# Patient Record
Sex: FEMALE | Race: WHITE | NOT HISPANIC OR LATINO | Employment: FULL TIME | URBAN - METROPOLITAN AREA
[De-identification: names, ages, dates, MRNs, and addresses within clinical notes are randomized per-mention and may not be internally consistent; named-entity substitution may affect disease eponyms.]

---

## 2020-04-08 ENCOUNTER — TELEPHONE (OUTPATIENT)
Dept: OBSTETRICS AND GYNECOLOGY | Facility: CLINIC | Age: 50
End: 2020-04-08

## 2020-04-08 DIAGNOSIS — N95.1 MENOPAUSAL SYMPTOMS: Primary | ICD-10-CM

## 2020-04-08 RX ORDER — NORETHINDRONE 5 MG/1
TABLET ORAL
Qty: 45 TABLET | Refills: 1 | Status: SHIPPED | OUTPATIENT
Start: 2020-04-08 | End: 2020-09-28

## 2020-04-08 RX ORDER — ESTRADIOL 1 MG/1
1 TABLET ORAL DAILY
Qty: 90 TABLET | Refills: 1 | Status: SHIPPED | OUTPATIENT
Start: 2020-04-08 | End: 2020-09-28

## 2020-04-08 NOTE — TELEPHONE ENCOUNTER
----- Message from Morris Reyes sent at 4/8/2020 12:10 PM CDT -----  Contact: Pt  Patsy called to speak with someone again about a prescription question she has [phone] (home).

## 2020-04-08 NOTE — TELEPHONE ENCOUNTER
----- Message from Luisana Dos Santos sent at 4/8/2020 10:49 AM CDT -----  Contact: patient  Type:  Patient Returning Call    Who Called:patient  Who Left Message for Patient:nurseRenea  Does the patient know what this is regarding?:yes  Would the patient rather a call back or a response via [employer]? Call back  Best Call Back Number:[phone]  Additional Information: n/a

## 2020-04-08 NOTE — TELEPHONE ENCOUNTER
----- Message from Morris Reyes sent at 4/8/2020  9:55 AM CDT -----  Contact: Pt  Patsy needs to speak with someone regarding a hormone medicine she's taking [phone].

## 2020-04-08 NOTE — TELEPHONE ENCOUNTER
Dr. Brennan spoke with Patient about talking with insurance to see what hormones was covered. Patient will send fax to Dr. Brennan showing what hormones were covered under her insurance

## 2020-04-08 NOTE — TELEPHONE ENCOUNTER
Patient called her Insurance company they did not have a formulary lists of medicines. They do approve of Estradiol 1mg and Norethindrone .35mg. If you approve of this please Send to [facility] for 90 days

## 2020-04-13 ENCOUNTER — TELEPHONE (OUTPATIENT)
Dept: OBSTETRICS AND GYNECOLOGY | Facility: CLINIC | Age: 50
End: 2020-04-13

## 2020-04-13 NOTE — TELEPHONE ENCOUNTER
Pt notified to take 1/2 tablet of 5 mg Norethindrone daily to equal 2.5 mg. Pt states understanding

## 2020-04-13 NOTE — TELEPHONE ENCOUNTER
----- Message from Gege Foss sent at 4/13/2020 11:18 AM CDT -----  Contact: self  Requesting call back regarding pt prescription. Please call back at [phone].

## 2020-04-13 NOTE — TELEPHONE ENCOUNTER
Patient just wants to make she is getting the right medicine strength. The pharmacy is sending 5Mg of Norethindrone she thought that she was getting the .35mg strength?     Please Advise

## 2020-05-26 ENCOUNTER — TELEPHONE (OUTPATIENT)
Dept: OBSTETRICS AND GYNECOLOGY | Facility: CLINIC | Age: 50
End: 2020-05-26

## 2020-05-26 NOTE — TELEPHONE ENCOUNTER
Spoke w/pt and she'd like to have her testosterone levels checked. Pt states she's having hot flashes and horrible headaches the day the shot is due and after the shot both those headaches are manageable. Pt uses [facility] on [address].

## 2020-05-26 NOTE — TELEPHONE ENCOUNTER
----- Message from Ivania Lozano sent at 5/26/2020  3:12 PM CDT -----  Contact: Patient   .Type:  Needs Medical Advice    Who Called:  Patient   Symptoms (please be specific):  Hormone levels    How long has patient had these symptoms:    Pharmacy name and phone #:    Would the patient rather a call back or a response via [facility]? call  Best Call Back Number:  [phone]  Additional Information: n/a

## 2020-05-27 LAB
E2: 42 PG/ML
FREE TESTOSTERONE: 9.26 NG/DL (ref 0–1)

## 2020-05-28 ENCOUNTER — PATIENT MESSAGE (OUTPATIENT)
Dept: OBSTETRICS AND GYNECOLOGY | Facility: CLINIC | Age: 50
End: 2020-05-28

## 2020-05-28 NOTE — TELEPHONE ENCOUNTER
Please notify pt that her testosterone level was elevated. This would be expected if she had her testosterone shot recently. If she needs to have the shot a day or 2 days early each month that should be fine.

## 2020-06-25 ENCOUNTER — PATIENT MESSAGE (OUTPATIENT)
Dept: OBSTETRICS AND GYNECOLOGY | Facility: CLINIC | Age: 50
End: 2020-06-25

## 2020-06-25 NOTE — TELEPHONE ENCOUNTER
Please notify pt that fluoxetine refilled. Please schedule pt's appt for annual that was originally July 3.

## 2020-07-13 DIAGNOSIS — N95.1 MENOPAUSAL SYMPTOMS: Primary | ICD-10-CM

## 2020-07-13 RX ORDER — TESTOSTERONE CYPIONATE 200 MG/ML
INJECTION, SOLUTION INTRAMUSCULAR
Qty: 1 ML | Refills: 2 | Status: SHIPPED | OUTPATIENT
Start: 2020-07-13 | End: 2021-02-24

## 2020-08-24 DIAGNOSIS — F32.A DEPRESSION, UNSPECIFIED DEPRESSION TYPE: ICD-10-CM

## 2020-08-24 RX ORDER — FLUOXETINE 10 MG/1
CAPSULE ORAL
Qty: 30 CAPSULE | Refills: 3 | Status: SHIPPED | OUTPATIENT
Start: 2020-08-24 | End: 2020-11-16

## 2020-08-24 NOTE — TELEPHONE ENCOUNTER
Notified pt her medication has been sent to pharmacy. Annual appt. christiano'd for 1/2021.  Pt verbalized understanding.

## 2020-12-31 RX ORDER — SUCRALFATE 1 G/1
1 TABLET ORAL 4 TIMES DAILY
COMMUNITY
Start: 2020-11-09 | End: 2021-03-04

## 2020-12-31 RX ORDER — OMEPRAZOLE 40 MG/1
40 CAPSULE, DELAYED RELEASE ORAL DAILY
COMMUNITY
Start: 2020-12-10 | End: 2023-01-10

## 2020-12-31 RX ORDER — FLUTICASONE PROPIONATE 50 MCG
1 SPRAY, SUSPENSION (ML) NASAL DAILY
COMMUNITY
Start: 2020-11-13 | End: 2021-02-24

## 2021-01-04 ENCOUNTER — OFFICE VISIT (OUTPATIENT)
Dept: OBSTETRICS AND GYNECOLOGY | Facility: CLINIC | Age: 51
End: 2021-01-04
Payer: COMMERCIAL

## 2021-01-04 VITALS
DIASTOLIC BLOOD PRESSURE: 60 MMHG | HEIGHT: 64 IN | BODY MASS INDEX: 25.64 KG/M2 | WEIGHT: 150.19 LBS | HEART RATE: 60 BPM | SYSTOLIC BLOOD PRESSURE: 110 MMHG

## 2021-01-04 DIAGNOSIS — N95.1 MENOPAUSAL SYMPTOMS: ICD-10-CM

## 2021-01-04 DIAGNOSIS — Z12.31 SCREENING MAMMOGRAM, ENCOUNTER FOR: ICD-10-CM

## 2021-01-04 DIAGNOSIS — Z01.419 WELL WOMAN EXAM WITH ROUTINE GYNECOLOGICAL EXAM: Primary | ICD-10-CM

## 2021-01-04 PROCEDURE — 1126F PR PAIN SEVERITY QUANTIFIED, NO PAIN PRESENT: ICD-10-PCS | Mod: S$GLB,,, | Performed by: OBSTETRICS & GYNECOLOGY

## 2021-01-04 PROCEDURE — 99396 PR PREVENTIVE VISIT,EST,40-64: ICD-10-PCS | Mod: S$GLB,,, | Performed by: OBSTETRICS & GYNECOLOGY

## 2021-01-04 PROCEDURE — 1126F AMNT PAIN NOTED NONE PRSNT: CPT | Mod: S$GLB,,, | Performed by: OBSTETRICS & GYNECOLOGY

## 2021-01-04 PROCEDURE — 3008F BODY MASS INDEX DOCD: CPT | Mod: CPTII,S$GLB,, | Performed by: OBSTETRICS & GYNECOLOGY

## 2021-01-04 PROCEDURE — 99396 PREV VISIT EST AGE 40-64: CPT | Mod: S$GLB,,, | Performed by: OBSTETRICS & GYNECOLOGY

## 2021-01-04 PROCEDURE — 3008F PR BODY MASS INDEX (BMI) DOCUMENTED: ICD-10-PCS | Mod: CPTII,S$GLB,, | Performed by: OBSTETRICS & GYNECOLOGY

## 2021-01-04 RX ORDER — FLUOXETINE 10 MG/1
10 CAPSULE ORAL DAILY
Qty: 90 CAPSULE | Refills: 4 | Status: SHIPPED | OUTPATIENT
Start: 2021-01-04 | End: 2021-02-19 | Stop reason: ALTCHOICE

## 2021-01-05 LAB
E2: 46 PG/ML
FREE TESTOSTERONE: 0.39 NG/DL (ref 0–1)

## 2021-01-06 ENCOUNTER — TELEPHONE (OUTPATIENT)
Dept: OBSTETRICS AND GYNECOLOGY | Facility: CLINIC | Age: 51
End: 2021-01-06

## 2021-01-06 DIAGNOSIS — N95.1 MENOPAUSAL SYMPTOMS: Primary | ICD-10-CM

## 2021-01-19 ENCOUNTER — TELEPHONE (OUTPATIENT)
Dept: OBSTETRICS AND GYNECOLOGY | Facility: CLINIC | Age: 51
End: 2021-01-19

## 2021-02-08 LAB
E2: 21.5 PG/ML
FREE TESTOSTERONE: NORMAL

## 2021-02-11 ENCOUNTER — TELEPHONE (OUTPATIENT)
Dept: OBSTETRICS AND GYNECOLOGY | Facility: CLINIC | Age: 51
End: 2021-02-11

## 2021-02-19 ENCOUNTER — TELEPHONE (OUTPATIENT)
Dept: OBSTETRICS AND GYNECOLOGY | Facility: CLINIC | Age: 51
End: 2021-02-19

## 2021-02-19 DIAGNOSIS — F41.9 ANXIETY: Primary | ICD-10-CM

## 2021-02-19 RX ORDER — ESCITALOPRAM OXALATE 10 MG/1
10 TABLET ORAL DAILY
Qty: 30 TABLET | Refills: 2 | Status: SHIPPED | OUTPATIENT
Start: 2021-02-19 | End: 2021-03-04

## 2021-02-24 ENCOUNTER — OFFICE VISIT (OUTPATIENT)
Dept: OBSTETRICS AND GYNECOLOGY | Facility: CLINIC | Age: 51
End: 2021-02-24
Payer: COMMERCIAL

## 2021-02-24 DIAGNOSIS — R68.82 DECREASED LIBIDO: ICD-10-CM

## 2021-02-24 DIAGNOSIS — N95.1 MENOPAUSE SYNDROME: ICD-10-CM

## 2021-02-24 DIAGNOSIS — R53.83 FATIGUE, UNSPECIFIED TYPE: Primary | ICD-10-CM

## 2021-02-24 DIAGNOSIS — R45.86 MOOD CHANGES: ICD-10-CM

## 2021-02-24 PROCEDURE — 99213 PR OFFICE/OUTPT VISIT, EST, LEVL III, 20-29 MIN: ICD-10-PCS | Mod: S$GLB,,, | Performed by: NURSE PRACTITIONER

## 2021-02-24 PROCEDURE — 99213 OFFICE O/P EST LOW 20 MIN: CPT | Mod: S$GLB,,, | Performed by: NURSE PRACTITIONER

## 2021-02-28 ENCOUNTER — PATIENT MESSAGE (OUTPATIENT)
Dept: OBSTETRICS AND GYNECOLOGY | Facility: CLINIC | Age: 51
End: 2021-02-28

## 2021-03-04 ENCOUNTER — PROCEDURE VISIT (OUTPATIENT)
Dept: OBSTETRICS AND GYNECOLOGY | Facility: CLINIC | Age: 51
End: 2021-03-04

## 2021-03-04 VITALS
HEIGHT: 64 IN | DIASTOLIC BLOOD PRESSURE: 79 MMHG | BODY MASS INDEX: 23.9 KG/M2 | WEIGHT: 140 LBS | SYSTOLIC BLOOD PRESSURE: 131 MMHG

## 2021-03-04 DIAGNOSIS — R53.83 FATIGUE, UNSPECIFIED TYPE: ICD-10-CM

## 2021-03-04 DIAGNOSIS — N95.1 MENOPAUSE SYNDROME: Primary | ICD-10-CM

## 2021-03-04 PROCEDURE — 11980 IMPLANT HORMONE PELLET(S): CPT | Mod: S$GLB,,, | Performed by: NURSE PRACTITIONER

## 2021-03-04 PROCEDURE — 11980 PR IMPLANT,HORMONE,SUBCUTANEOUS: ICD-10-PCS | Mod: S$GLB,,, | Performed by: NURSE PRACTITIONER

## 2021-03-15 ENCOUNTER — PATIENT MESSAGE (OUTPATIENT)
Dept: OBSTETRICS AND GYNECOLOGY | Facility: CLINIC | Age: 51
End: 2021-03-15

## 2021-04-14 LAB
E2: 61.3 PG/ML
FSH: <1 MIU/ML
TESTOST SERPL-MCNC: 134 NG/DL (ref 2.9–40.8)

## 2021-04-15 ENCOUNTER — PATIENT MESSAGE (OUTPATIENT)
Dept: OBSTETRICS AND GYNECOLOGY | Facility: CLINIC | Age: 51
End: 2021-04-15

## 2021-06-01 ENCOUNTER — PATIENT MESSAGE (OUTPATIENT)
Dept: OBSTETRICS AND GYNECOLOGY | Facility: CLINIC | Age: 51
End: 2021-06-01

## 2021-06-10 ENCOUNTER — PROCEDURE VISIT (OUTPATIENT)
Dept: OBSTETRICS AND GYNECOLOGY | Facility: CLINIC | Age: 51
End: 2021-06-10

## 2021-06-10 VITALS
SYSTOLIC BLOOD PRESSURE: 109 MMHG | DIASTOLIC BLOOD PRESSURE: 71 MMHG | HEART RATE: 73 BPM | WEIGHT: 140 LBS | HEIGHT: 64 IN | BODY MASS INDEX: 23.9 KG/M2

## 2021-06-10 DIAGNOSIS — R53.83 FATIGUE, UNSPECIFIED TYPE: ICD-10-CM

## 2021-06-10 DIAGNOSIS — N95.1 MENOPAUSE SYNDROME: Primary | ICD-10-CM

## 2021-06-10 PROCEDURE — 11980 PR IMPLANT,HORMONE,SUBCUTANEOUS: ICD-10-PCS | Mod: S$GLB,,, | Performed by: NURSE PRACTITIONER

## 2021-06-10 PROCEDURE — 11980 IMPLANT HORMONE PELLET(S): CPT | Mod: S$GLB,,, | Performed by: NURSE PRACTITIONER

## 2021-06-10 RX ORDER — PROGESTERONE 200 MG/1
200 CAPSULE ORAL NIGHTLY
Qty: 30 CAPSULE | Refills: 11 | Status: SHIPPED | OUTPATIENT
Start: 2021-06-10 | End: 2022-03-18 | Stop reason: ALTCHOICE

## 2021-08-25 ENCOUNTER — PATIENT MESSAGE (OUTPATIENT)
Dept: OBSTETRICS AND GYNECOLOGY | Facility: CLINIC | Age: 51
End: 2021-08-25

## 2021-09-13 ENCOUNTER — PROCEDURE VISIT (OUTPATIENT)
Dept: OBSTETRICS AND GYNECOLOGY | Facility: CLINIC | Age: 51
End: 2021-09-13

## 2021-09-13 VITALS
SYSTOLIC BLOOD PRESSURE: 115 MMHG | BODY MASS INDEX: 24.24 KG/M2 | WEIGHT: 142 LBS | DIASTOLIC BLOOD PRESSURE: 74 MMHG | HEIGHT: 64 IN | HEART RATE: 74 BPM

## 2021-09-13 DIAGNOSIS — N95.1 MENOPAUSE SYNDROME: Primary | ICD-10-CM

## 2021-09-13 DIAGNOSIS — R53.83 FATIGUE, UNSPECIFIED TYPE: ICD-10-CM

## 2021-09-13 PROCEDURE — 11980 PR IMPLANT,HORMONE,SUBCUTANEOUS: ICD-10-PCS | Mod: S$GLB,,, | Performed by: NURSE PRACTITIONER

## 2021-09-13 PROCEDURE — 11980 IMPLANT HORMONE PELLET(S): CPT | Mod: S$GLB,,, | Performed by: NURSE PRACTITIONER

## 2021-09-13 RX ORDER — FLUTICASONE PROPIONATE 50 MCG
SPRAY, SUSPENSION (ML) NASAL
COMMUNITY
Start: 2021-08-31

## 2021-10-25 LAB
CHOLEST SERPL-MSCNC: 198 MG/DL (ref 100–200)
HDLC SERPL-MCNC: 61 MG/DL
LDL/HDL RATIO: 2 (ref 1–3)
LDLC SERPL CALC-MCNC: 119.8 MG/DL (ref 0–100)
TESTOST SERPL-MCNC: 268 NG/DL (ref 2.9–40.8)
TRIGL SERPL-MCNC: 86 MG/DL (ref 0–150)

## 2021-10-26 ENCOUNTER — PATIENT MESSAGE (OUTPATIENT)
Dept: OBSTETRICS AND GYNECOLOGY | Facility: CLINIC | Age: 51
End: 2021-10-26
Payer: COMMERCIAL

## 2021-11-29 ENCOUNTER — TELEPHONE (OUTPATIENT)
Dept: OBSTETRICS AND GYNECOLOGY | Facility: CLINIC | Age: 51
End: 2021-11-29
Payer: COMMERCIAL

## 2021-11-29 ENCOUNTER — OFFICE VISIT (OUTPATIENT)
Dept: OBSTETRICS AND GYNECOLOGY | Facility: CLINIC | Age: 51
End: 2021-11-29
Payer: COMMERCIAL

## 2021-11-29 VITALS
SYSTOLIC BLOOD PRESSURE: 128 MMHG | DIASTOLIC BLOOD PRESSURE: 89 MMHG | BODY MASS INDEX: 22.42 KG/M2 | WEIGHT: 130.63 LBS

## 2021-11-29 DIAGNOSIS — F32.A ANXIETY AND DEPRESSION: Primary | ICD-10-CM

## 2021-11-29 DIAGNOSIS — F41.9 ANXIETY AND DEPRESSION: Primary | ICD-10-CM

## 2021-11-29 PROCEDURE — 99213 PR OFFICE/OUTPT VISIT, EST, LEVL III, 20-29 MIN: ICD-10-PCS | Mod: S$GLB,,, | Performed by: OBSTETRICS & GYNECOLOGY

## 2021-11-29 PROCEDURE — 99213 OFFICE O/P EST LOW 20 MIN: CPT | Mod: S$GLB,,, | Performed by: OBSTETRICS & GYNECOLOGY

## 2021-11-29 RX ORDER — ESCITALOPRAM OXALATE 10 MG/1
10 TABLET ORAL DAILY
Qty: 30 TABLET | Refills: 5 | Status: SHIPPED | OUTPATIENT
Start: 2021-11-29 | End: 2021-12-15 | Stop reason: DRUGHIGH

## 2021-12-06 ENCOUNTER — TELEPHONE (OUTPATIENT)
Dept: OBSTETRICS AND GYNECOLOGY | Facility: CLINIC | Age: 51
End: 2021-12-06
Payer: COMMERCIAL

## 2021-12-06 DIAGNOSIS — N95.1 MENOPAUSAL SYMPTOMS: Primary | ICD-10-CM

## 2021-12-07 ENCOUNTER — TELEPHONE (OUTPATIENT)
Dept: OBSTETRICS AND GYNECOLOGY | Facility: CLINIC | Age: 51
End: 2021-12-07
Payer: COMMERCIAL

## 2021-12-10 ENCOUNTER — PATIENT MESSAGE (OUTPATIENT)
Dept: OBSTETRICS AND GYNECOLOGY | Facility: CLINIC | Age: 51
End: 2021-12-10
Payer: COMMERCIAL

## 2021-12-10 DIAGNOSIS — F32.A ANXIETY AND DEPRESSION: Primary | ICD-10-CM

## 2021-12-10 DIAGNOSIS — F41.9 ANXIETY AND DEPRESSION: Primary | ICD-10-CM

## 2021-12-14 RX ORDER — CLORAZEPATE DIPOTASSIUM 7.5 MG/1
7.5 TABLET ORAL 2 TIMES DAILY PRN
Qty: 30 TABLET | Refills: 0 | Status: SHIPPED | OUTPATIENT
Start: 2021-12-14 | End: 2021-12-30 | Stop reason: SDUPTHER

## 2021-12-15 ENCOUNTER — TELEPHONE (OUTPATIENT)
Dept: OBSTETRICS AND GYNECOLOGY | Facility: CLINIC | Age: 51
End: 2021-12-15
Payer: COMMERCIAL

## 2021-12-15 DIAGNOSIS — F32.A ANXIETY AND DEPRESSION: Primary | ICD-10-CM

## 2021-12-15 DIAGNOSIS — F41.9 ANXIETY AND DEPRESSION: Primary | ICD-10-CM

## 2021-12-15 RX ORDER — ESCITALOPRAM OXALATE 20 MG/1
20 TABLET ORAL DAILY
Qty: 90 TABLET | Refills: 3 | Status: SHIPPED | OUTPATIENT
Start: 2021-12-15 | End: 2022-01-05 | Stop reason: SDUPTHER

## 2021-12-30 ENCOUNTER — PATIENT MESSAGE (OUTPATIENT)
Dept: OBSTETRICS AND GYNECOLOGY | Facility: CLINIC | Age: 51
End: 2021-12-30
Payer: COMMERCIAL

## 2022-01-05 ENCOUNTER — OFFICE VISIT (OUTPATIENT)
Dept: OBSTETRICS AND GYNECOLOGY | Facility: CLINIC | Age: 52
End: 2022-01-05
Payer: COMMERCIAL

## 2022-01-05 VITALS
SYSTOLIC BLOOD PRESSURE: 104 MMHG | BODY MASS INDEX: 22.02 KG/M2 | HEIGHT: 64 IN | HEART RATE: 73 BPM | WEIGHT: 129 LBS | DIASTOLIC BLOOD PRESSURE: 66 MMHG

## 2022-01-05 DIAGNOSIS — Z12.31 SCREENING MAMMOGRAM, ENCOUNTER FOR: ICD-10-CM

## 2022-01-05 DIAGNOSIS — Z01.419 WELL WOMAN EXAM WITH ROUTINE GYNECOLOGICAL EXAM: Primary | ICD-10-CM

## 2022-01-05 DIAGNOSIS — F41.9 ANXIETY AND DEPRESSION: ICD-10-CM

## 2022-01-05 DIAGNOSIS — N95.1 MENOPAUSAL SYMPTOMS: ICD-10-CM

## 2022-01-05 DIAGNOSIS — Z13.820 SCREENING FOR OSTEOPOROSIS: ICD-10-CM

## 2022-01-05 DIAGNOSIS — F32.A ANXIETY AND DEPRESSION: ICD-10-CM

## 2022-01-05 PROCEDURE — 3008F BODY MASS INDEX DOCD: CPT | Mod: CPTII,S$GLB,, | Performed by: OBSTETRICS & GYNECOLOGY

## 2022-01-05 PROCEDURE — 99396 PREV VISIT EST AGE 40-64: CPT | Mod: S$GLB,,, | Performed by: OBSTETRICS & GYNECOLOGY

## 2022-01-05 PROCEDURE — 1159F MED LIST DOCD IN RCRD: CPT | Mod: CPTII,S$GLB,, | Performed by: OBSTETRICS & GYNECOLOGY

## 2022-01-05 PROCEDURE — 3078F PR MOST RECENT DIASTOLIC BLOOD PRESSURE < 80 MM HG: ICD-10-PCS | Mod: CPTII,S$GLB,, | Performed by: OBSTETRICS & GYNECOLOGY

## 2022-01-05 PROCEDURE — 1159F PR MEDICATION LIST DOCUMENTED IN MEDICAL RECORD: ICD-10-PCS | Mod: CPTII,S$GLB,, | Performed by: OBSTETRICS & GYNECOLOGY

## 2022-01-05 PROCEDURE — 3078F DIAST BP <80 MM HG: CPT | Mod: CPTII,S$GLB,, | Performed by: OBSTETRICS & GYNECOLOGY

## 2022-01-05 PROCEDURE — 3074F PR MOST RECENT SYSTOLIC BLOOD PRESSURE < 130 MM HG: ICD-10-PCS | Mod: CPTII,S$GLB,, | Performed by: OBSTETRICS & GYNECOLOGY

## 2022-01-05 PROCEDURE — 99396 PR PREVENTIVE VISIT,EST,40-64: ICD-10-PCS | Mod: S$GLB,,, | Performed by: OBSTETRICS & GYNECOLOGY

## 2022-01-05 PROCEDURE — 3008F PR BODY MASS INDEX (BMI) DOCUMENTED: ICD-10-PCS | Mod: CPTII,S$GLB,, | Performed by: OBSTETRICS & GYNECOLOGY

## 2022-01-05 PROCEDURE — 3074F SYST BP LT 130 MM HG: CPT | Mod: CPTII,S$GLB,, | Performed by: OBSTETRICS & GYNECOLOGY

## 2022-01-05 RX ORDER — CLORAZEPATE DIPOTASSIUM 7.5 MG/1
7.5 TABLET ORAL 2 TIMES DAILY PRN
Qty: 60 TABLET | Refills: 0 | Status: SHIPPED | OUTPATIENT
Start: 2022-01-05 | End: 2022-02-14 | Stop reason: SDUPTHER

## 2022-01-05 RX ORDER — ESCITALOPRAM OXALATE 20 MG/1
30 TABLET ORAL DAILY
Qty: 135 TABLET | Refills: 3 | Status: SHIPPED | OUTPATIENT
Start: 2022-01-05 | End: 2023-01-10

## 2022-01-05 NOTE — PROGRESS NOTES
Patsy Sullivan is a 51 y.o. female  who presents for a well woman exam.  She has no issues, problems, or complaints.    Past Medical History:   Diagnosis Date    Anxiety and depression     Esophageal spasm     H/O gastroesophageal reflux (GERD)     History of IBS     Hormone replacement therapy (HRT)     Menopause syndrome        Past Surgical History:   Procedure Laterality Date    AUGMENTATION OF BREAST  2006    CHOLECYSTECTOMY  2000    HYSTEROSCOPY WITH DILATION AND CURETTAGE OF UTERUS  2008    with Novasure endometrial ablation       OB History    Para Term  AB Living   2 2       2   SAB IAB Ectopic Multiple Live Births                  # Outcome Date GA Lbr Dylan/2nd Weight Sex Delivery Anes PTL Lv   2 Para            1 Para                Family History   Problem Relation Age of Onset    No Known Problems Father     No Known Problems Mother     No Known Problems Sister     No Known Problems Brother     No Known Problems Maternal Grandmother     No Known Problems Maternal Grandfather     No Known Problems Paternal Grandmother     No Known Problems Paternal Grandfather        Social History     Tobacco Use    Smoking status: Never Smoker    Smokeless tobacco: Never Used   Substance Use Topics    Alcohol use: Never    Drug use: Never         Current Outpatient Medications:     EScitalopram oxalate (LEXAPRO) 20 MG tablet, Take 1.5 tablets (30 mg total) by mouth once daily., Disp: 135 tablet, Rfl: 3    estradiol 50 mg pellet, Inject 15 mg into the muscle., Disp: , Rfl:     fluticasone propionate (FLONASE) 50 mcg/actuation nasal spray, SPRAY 1 SPRAY INTO EACH NOSTRIL EVERY DAY *CAN FILL 7/2 ON MEDICAID*, Disp: , Rfl:     progesterone (PROMETRIUM) 200 MG capsule, Take 1 capsule (200 mg total) by mouth nightly., Disp: 30 capsule, Rfl: 11    testosterone 100 mg pellet, Inject 100 mg into the muscle., Disp: , Rfl:     clorazepate (TRANXENE) 7.5 MG Tab, Take 1 tablet  "(7.5 mg total) by mouth 2 (two) times daily as needed (anxiety)., Disp: 60 tablet, Rfl: 0    omeprazole (PRILOSEC) 40 MG capsule, Take 40 mg by mouth once daily., Disp: , Rfl:      Review of patient's allergies indicates:   Allergen Reactions    Codeine Other (See Comments)     disorientation    Phenergan [promethazine] Other (See Comments)     GI upset        ROS:  GENERAL: Denies weight gain or weight loss. Feeling well overall.   SKIN: Denies rash or lesions.   HEAD: Denies head injury or headache.   NODES: Denies enlarged lymph nodes.   CHEST: Denies shortness of breath.   CARDIOVASCULAR: Denies palpitations or chest pain.   ABDOMEN: Denies abdominal pain, constipation, diarrhea, nausea, vomiting or rectal bleeding.   URINARY: Denies frequency, dysuria, hematuria, or burning on urination.  REPRODUCTIVE: See HPI.   BREASTS: Denies pain, lumps, or nipple discharge.   HEMATOLOGIC: Denies easy bruisability or excessive bleeding.  MUSCULOSKELETAL: Denies joint pain or swelling.   NEUROLOGIC: Denies syncope or weakness.   PSYCHIATRIC: Denies depression, anxiety or mood swings.    PHYSICAL EXAM:    /66   Pulse 73   Ht 5' 4" (1.626 m)   Wt 58.5 kg (129 lb)   BMI 22.14 kg/m²    Body mass index is 22.14 kg/m².     APPEARANCE: Well nourished, well developed, in no acute distress.  AFFECT: WNL, alert and oriented x 3  SKIN: No acne or hirsutism  NECK: Neck symmetric without masses or thyromegaly  NODES: No inguinal, cervical, axillary, or femoral lymph node enlargement  CHEST: Good respiratory effect  ABDOMEN: Soft.  No tenderness or masses.  No hepatosplenomegaly.  No hernias.  BREASTS: Symmetrical, no skin changes or visible lesions.  No palpable masses, nipple discharge bilaterally.  PELVIC: Normal external genitalia without lesions.  Normal hair distribution.  Adequate perineal body, normal urethral meatus.  Urethra and bladder without tenderness or masses. Vagina moist and well rugated without lesions or " discharge.  Cervix pink, without lesions, discharge or tenderness.  No significant cystocele or rectocele.  Bimanual exam shows uterus to be normal size, regular, mobile and nontender.  Adnexa without masses or tenderness.    EXTREMITIES: No edema.     Well woman exam with routine gynecological exam  -     Liquid-based pap smear, screening    Screening mammogram, encounter for  -     Mammo Digital Screening Bilat w/ Canelo; Future    Screening for osteoporosis  -     DXA Bone Density Spine And Hip; Future; Expected date: 01/05/2022    Anxiety and depression  -     EScitalopram oxalate (LEXAPRO) 20 MG tablet; Take 1.5 tablets (30 mg total) by mouth once daily.  Dispense: 135 tablet; Refill: 3  -     clorazepate (TRANXENE) 7.5 MG Tab; Take 1 tablet (7.5 mg total) by mouth 2 (two) times daily as needed (anxiety).  Dispense: 60 tablet; Refill: 0    Menopausal symptoms  -     Estradiol; Future; Expected date: 01/12/2022  -     Testosterone, Free; Future; Expected date: 01/12/2022         Patient was counseled today on A.C.S. Pap guidelines and recommendations for yearly pelvic exams, mammograms and monthly self breast exams; to see her PCP for other health maintenance.     Follow up in 1 year

## 2022-01-07 LAB
E2: 225 PG/ML
FREE TESTOSTERONE: 0.12 NG/DL (ref 0–1)

## 2022-01-12 ENCOUNTER — TELEPHONE (OUTPATIENT)
Dept: OBSTETRICS AND GYNECOLOGY | Facility: CLINIC | Age: 52
End: 2022-01-12
Payer: COMMERCIAL

## 2022-01-12 NOTE — TELEPHONE ENCOUNTER
----- Message from Anne Orlando sent at 1/12/2022  8:54 AM CST -----   Patient need to speak to nurse regarding her medication change. Call back number [phone]. Sarmads

## 2022-01-12 NOTE — TELEPHONE ENCOUNTER
Pt states that after the tranxene was decreased and lexapro increased last week she is getting back to how she felt before the meds   Very shaky and nervous.  Crying on the phone  Please advise

## 2022-01-13 ENCOUNTER — PATIENT MESSAGE (OUTPATIENT)
Dept: OBSTETRICS AND GYNECOLOGY | Facility: CLINIC | Age: 52
End: 2022-01-13
Payer: COMMERCIAL

## 2022-01-14 ENCOUNTER — PATIENT MESSAGE (OUTPATIENT)
Dept: OBSTETRICS AND GYNECOLOGY | Facility: CLINIC | Age: 52
End: 2022-01-14
Payer: COMMERCIAL

## 2022-01-25 ENCOUNTER — PATIENT MESSAGE (OUTPATIENT)
Dept: OBSTETRICS AND GYNECOLOGY | Facility: CLINIC | Age: 52
End: 2022-01-25
Payer: COMMERCIAL

## 2022-01-25 DIAGNOSIS — N95.1 MENOPAUSAL SYMPTOMS: ICD-10-CM

## 2022-01-25 DIAGNOSIS — N95.1 MENOPAUSE SYNDROME: Primary | ICD-10-CM

## 2022-01-25 RX ORDER — TESTOSTERONE CYPIONATE 200 MG/ML
200 INJECTION, SOLUTION INTRAMUSCULAR
Qty: 1 ML | Refills: 1 | Status: SHIPPED | OUTPATIENT
Start: 2022-01-25 | End: 2022-05-02 | Stop reason: SDUPTHER

## 2022-02-14 ENCOUNTER — PATIENT MESSAGE (OUTPATIENT)
Dept: OBSTETRICS AND GYNECOLOGY | Facility: CLINIC | Age: 52
End: 2022-02-14
Payer: COMMERCIAL

## 2022-02-14 DIAGNOSIS — F32.A ANXIETY AND DEPRESSION: ICD-10-CM

## 2022-02-14 DIAGNOSIS — F41.9 ANXIETY AND DEPRESSION: ICD-10-CM

## 2022-02-14 RX ORDER — CLORAZEPATE DIPOTASSIUM 7.5 MG/1
7.5 TABLET ORAL 2 TIMES DAILY PRN
Qty: 60 TABLET | Refills: 0 | Status: SHIPPED | OUTPATIENT
Start: 2022-02-14 | End: 2022-03-15

## 2022-03-14 ENCOUNTER — PATIENT MESSAGE (OUTPATIENT)
Dept: OBSTETRICS AND GYNECOLOGY | Facility: CLINIC | Age: 52
End: 2022-03-14
Payer: COMMERCIAL

## 2022-03-14 DIAGNOSIS — F41.9 ANXIETY AND DEPRESSION: ICD-10-CM

## 2022-03-14 DIAGNOSIS — F32.A ANXIETY AND DEPRESSION: ICD-10-CM

## 2022-03-14 DIAGNOSIS — N95.1 MENOPAUSAL SYMPTOMS: Primary | ICD-10-CM

## 2022-03-15 RX ORDER — CLORAZEPATE DIPOTASSIUM 7.5 MG/1
TABLET ORAL
Qty: 60 TABLET | Refills: 0 | Status: SHIPPED | OUTPATIENT
Start: 2022-03-15 | End: 2022-04-25

## 2022-03-15 NOTE — TELEPHONE ENCOUNTER
Please notify pt refill sent but that this is a controlled substance so this cannot be on auto refill. If she is needing to take this twice a day every day we need to change to something else.

## 2022-03-16 LAB
E2: 66.3 PG/ML
FREE TESTOSTERONE: 0.1 NG/DL (ref 0–1)
PROGEST SERPL-MCNC: 1.43 NG/ML

## 2022-03-16 NOTE — TELEPHONE ENCOUNTER
Please have pt repeat hormone levels. Orders sent to [facility]. She does not need to fast. When was her last pellet insertion?

## 2022-03-16 NOTE — TELEPHONE ENCOUNTER
Pt notified of lab [facility] being at [facility] and that her last pellet insertion was done about 5 months ago. Dr. Brennan is informed of this. Pt asked if she should continue the traxene. I advised pt to continue taking in like normal until after we get her lab results then Dr. Brennan will advise on what to do next (Per Dr. Brennan). Pt verbalized udnerstanding.

## 2022-03-18 DIAGNOSIS — N95.1 MENOPAUSAL SYMPTOMS: Primary | ICD-10-CM

## 2022-04-12 ENCOUNTER — PATIENT MESSAGE (OUTPATIENT)
Dept: OBSTETRICS AND GYNECOLOGY | Facility: CLINIC | Age: 52
End: 2022-04-12
Payer: COMMERCIAL

## 2022-04-13 LAB
E2: 51.8 PG/ML
FREE TESTOSTERONE: 0.02 NG/DL (ref 0–1)
PROGEST SERPL-MCNC: 0.21 NG/ML

## 2022-04-25 DIAGNOSIS — F32.A ANXIETY AND DEPRESSION: ICD-10-CM

## 2022-04-25 DIAGNOSIS — F41.9 ANXIETY AND DEPRESSION: ICD-10-CM

## 2022-04-25 RX ORDER — CLORAZEPATE DIPOTASSIUM 7.5 MG/1
TABLET ORAL
Qty: 60 TABLET | Refills: 0 | Status: SHIPPED | OUTPATIENT
Start: 2022-04-25 | End: 2022-05-02

## 2022-04-30 DIAGNOSIS — F32.A ANXIETY AND DEPRESSION: ICD-10-CM

## 2022-04-30 DIAGNOSIS — F41.9 ANXIETY AND DEPRESSION: ICD-10-CM

## 2022-05-02 ENCOUNTER — PATIENT MESSAGE (OUTPATIENT)
Dept: OBSTETRICS AND GYNECOLOGY | Facility: CLINIC | Age: 52
End: 2022-05-02
Payer: COMMERCIAL

## 2022-05-02 ENCOUNTER — TELEPHONE (OUTPATIENT)
Dept: OBSTETRICS AND GYNECOLOGY | Facility: CLINIC | Age: 52
End: 2022-05-02
Payer: COMMERCIAL

## 2022-05-02 DIAGNOSIS — N95.1 MENOPAUSAL SYMPTOMS: ICD-10-CM

## 2022-05-02 RX ORDER — CLORAZEPATE DIPOTASSIUM 7.5 MG/1
TABLET ORAL
Qty: 60 TABLET | Refills: 0 | Status: SHIPPED | OUTPATIENT
Start: 2022-05-02 | End: 2022-05-13 | Stop reason: DRUGHIGH

## 2022-05-02 RX ORDER — TESTOSTERONE CYPIONATE 200 MG/ML
200 INJECTION, SOLUTION INTRAMUSCULAR
Qty: 1 ML | Refills: 1 | Status: SHIPPED | OUTPATIENT
Start: 2022-05-02 | End: 2022-07-22 | Stop reason: ALTCHOICE

## 2022-05-11 ENCOUNTER — PATIENT MESSAGE (OUTPATIENT)
Dept: OBSTETRICS AND GYNECOLOGY | Facility: CLINIC | Age: 52
End: 2022-05-11
Payer: COMMERCIAL

## 2022-05-11 DIAGNOSIS — F32.A ANXIETY AND DEPRESSION: Primary | ICD-10-CM

## 2022-05-11 DIAGNOSIS — F41.9 ANXIETY AND DEPRESSION: Primary | ICD-10-CM

## 2022-05-13 RX ORDER — CLORAZEPATE DIPOTASSIUM 3.75 MG/1
3.75 TABLET ORAL NIGHTLY PRN
Qty: 30 TABLET | Refills: 0 | Status: SHIPPED | OUTPATIENT
Start: 2022-05-13 | End: 2022-05-30 | Stop reason: DRUGHIGH

## 2022-05-30 ENCOUNTER — TELEPHONE (OUTPATIENT)
Dept: OBSTETRICS AND GYNECOLOGY | Facility: CLINIC | Age: 52
End: 2022-05-30
Payer: COMMERCIAL

## 2022-05-30 DIAGNOSIS — F32.A ANXIETY AND DEPRESSION: ICD-10-CM

## 2022-05-30 DIAGNOSIS — F41.9 ANXIETY AND DEPRESSION: ICD-10-CM

## 2022-05-30 RX ORDER — CLORAZEPATE DIPOTASSIUM 7.5 MG/1
7.5 TABLET ORAL NIGHTLY PRN
Qty: 30 TABLET | Refills: 2 | Status: SHIPPED | OUTPATIENT
Start: 2022-05-30 | End: 2022-08-26 | Stop reason: SDUPTHER

## 2022-05-30 NOTE — TELEPHONE ENCOUNTER
Pt states she had a bad weekend with her symptoms coming back like they were before her meds began  States she called in and Dr Peña was on call and he suggested she go back to the original dose until she could reach out to you on Monday  Pt would like to know what to do, says her insurance was giving her a problem filling it and she was trying to wean off however she feels like she needs to go back to the original dose or try something else that would work that insurance may cover  Please advise

## 2022-05-30 NOTE — TELEPHONE ENCOUNTER
----- Message from Ene Santoro sent at 5/30/2022  9:12 AM CDT -----  Contact: Patient  Patient need the nurse to call her regarding her RX      Call back #  [phone]

## 2022-05-30 NOTE — TELEPHONE ENCOUNTER
Patient with increased AM anxiety weaning Tranxene.  She is taking Lexapro 20 mg and Tranxene 3.75 mg nightly. Prescription sent for 7.5 mg tranxene nightly and she will start taking Lexapro in the AM.

## 2022-07-13 ENCOUNTER — TELEPHONE (OUTPATIENT)
Dept: OBSTETRICS AND GYNECOLOGY | Facility: CLINIC | Age: 52
End: 2022-07-13
Payer: COMMERCIAL

## 2022-07-13 DIAGNOSIS — N95.1 MENOPAUSAL SYMPTOMS: Primary | ICD-10-CM

## 2022-07-13 DIAGNOSIS — R53.83 FATIGUE, UNSPECIFIED TYPE: ICD-10-CM

## 2022-07-13 NOTE — TELEPHONE ENCOUNTER
Patsy is unsure if she wants to continue testosterone, or if the patch is giving her issues. She is having weight gain and always tired, naps most days. Not feeling herself, wants to discuss meds. Is this something you would like her to come in for and consult, or she is ok with over the phone as well. Please advise. Thanks .

## 2022-07-13 NOTE — TELEPHONE ENCOUNTER
Please notify that I would like her to have levels drawn. Orders sent to [facility] on [facility].

## 2022-07-13 NOTE — TELEPHONE ENCOUNTER
----- Message from Janneth Rushing sent at 7/13/2022  1:43 PM CDT -----  Patsy Sullivan would like for the nurse to give her a call back in regards to a medication she's on. [phone] (home)

## 2022-07-14 LAB
E2: 132 PG/ML
FREE TESTOSTERONE: NORMAL
PROGEST SERPL-MCNC: <0.2 NG/ML
TSH SERPL DL<=0.005 MIU/L-ACNC: 2.04 UIU/ML (ref 0.27–4.2)

## 2022-07-18 ENCOUNTER — PATIENT MESSAGE (OUTPATIENT)
Dept: OBSTETRICS AND GYNECOLOGY | Facility: CLINIC | Age: 52
End: 2022-07-18
Payer: COMMERCIAL

## 2022-07-18 DIAGNOSIS — N95.1 MENOPAUSAL SYMPTOMS: Primary | ICD-10-CM

## 2022-07-22 RX ORDER — ESTERIFIED ESTROGEN AND METHYLTESTOSTERONE .625; 1.25 MG/1; MG/1
1 TABLET ORAL DAILY
Qty: 30 TABLET | Refills: 5 | Status: SHIPPED | OUTPATIENT
Start: 2022-07-22 | End: 2022-08-16 | Stop reason: DRUGHIGH

## 2022-07-22 RX ORDER — PROGESTERONE 200 MG/1
200 CAPSULE ORAL NIGHTLY
Qty: 30 CAPSULE | Refills: 5 | Status: SHIPPED | OUTPATIENT
Start: 2022-07-22 | End: 2023-01-10

## 2022-07-22 NOTE — TELEPHONE ENCOUNTER
Discussed lab results with patient. Estradiol and thyroid ok but needs improvement in testosterone and progesterone. Will d/c Combipatch and testosterone IM q 10 weeks. New prescriptions for Estratest HS and Prometrium sent. She will follow up in 4 weeks with repeat labs. Lab orders sent.

## 2022-07-24 DIAGNOSIS — N95.1 MENOPAUSAL SYMPTOMS: ICD-10-CM

## 2022-07-24 RX ORDER — TESTOSTERONE CYPIONATE 200 MG/ML
INJECTION, SOLUTION INTRAMUSCULAR
Qty: 1 ML | Refills: 1 | OUTPATIENT
Start: 2022-07-24

## 2022-08-15 LAB
E2: 30.5 PG/ML
FREE TESTOSTERONE: 0.09 NG/DL (ref 0–1)
PROGEST SERPL-MCNC: 1.2 NG/ML

## 2022-08-16 DIAGNOSIS — N95.1 MENOPAUSAL SYMPTOMS: Primary | ICD-10-CM

## 2022-08-16 RX ORDER — ESTERIFIED ESTROGEN AND METHYLTESTOSTERONE 1.25; 2.5 MG/1; MG/1
1 TABLET ORAL DAILY
Qty: 30 TABLET | Refills: 5 | Status: SHIPPED | OUTPATIENT
Start: 2022-08-16 | End: 2023-01-10

## 2022-08-26 DIAGNOSIS — F41.9 ANXIETY AND DEPRESSION: ICD-10-CM

## 2022-08-26 DIAGNOSIS — F32.A ANXIETY AND DEPRESSION: ICD-10-CM

## 2022-08-26 RX ORDER — CLORAZEPATE DIPOTASSIUM 7.5 MG/1
7.5 TABLET ORAL NIGHTLY PRN
Qty: 30 TABLET | Refills: 2 | Status: SHIPPED | OUTPATIENT
Start: 2022-08-26 | End: 2023-01-10

## 2022-08-26 NOTE — TELEPHONE ENCOUNTER
----- Message from Ene Santoro sent at 8/26/2022 10:48 AM CDT -----  Contact: Patient  Patient need the nurse to  call her regarding the below Rx        estrogens,esterified,-methyltestosterone 1.25-2.5mg (ESTRATEST) per tablet              Call back #  [phone]

## 2022-09-27 ENCOUNTER — PATIENT MESSAGE (OUTPATIENT)
Dept: OBSTETRICS AND GYNECOLOGY | Facility: CLINIC | Age: 52
End: 2022-09-27
Payer: COMMERCIAL

## 2022-09-28 ENCOUNTER — TELEPHONE (OUTPATIENT)
Dept: OBSTETRICS AND GYNECOLOGY | Facility: CLINIC | Age: 52
End: 2022-09-28
Payer: COMMERCIAL

## 2022-09-28 NOTE — TELEPHONE ENCOUNTER
"Pt. Wants to get off of all anxiety meds. And HRT.  "Causing issues"  How does she do that?    Right Now she is doing:  Clorazepate 1 qod x 2days    Escitalopram 20mg 1/2 hs x 2 days      How does she ween off HRT also? (Testosterone, Estratest and Progesterone)    Would like something "natural" for HRT/menopause sx.    Still taking the "DIM" supplement for hot flashes.   "

## 2022-10-03 ENCOUNTER — PATIENT MESSAGE (OUTPATIENT)
Dept: OBSTETRICS AND GYNECOLOGY | Facility: CLINIC | Age: 52
End: 2022-10-03
Payer: COMMERCIAL

## 2022-10-03 NOTE — TELEPHONE ENCOUNTER
Please have pt start weaning to 1/2 tablet of Estratest first for 2 weeks and continue other medications as is.  She should call to update in 2 weeks

## 2022-10-13 ENCOUNTER — PATIENT MESSAGE (OUTPATIENT)
Dept: OBSTETRICS AND GYNECOLOGY | Facility: CLINIC | Age: 52
End: 2022-10-13
Payer: COMMERCIAL

## 2022-10-17 ENCOUNTER — PATIENT MESSAGE (OUTPATIENT)
Dept: OBSTETRICS AND GYNECOLOGY | Facility: CLINIC | Age: 52
End: 2022-10-17
Payer: COMMERCIAL

## 2022-10-17 ENCOUNTER — TELEPHONE (OUTPATIENT)
Dept: OBSTETRICS AND GYNECOLOGY | Facility: CLINIC | Age: 52
End: 2022-10-17
Payer: COMMERCIAL

## 2022-10-17 NOTE — TELEPHONE ENCOUNTER
Pt. Has been taking 1/2 Estratest x 2 weeks.  In process on weaning off meds.  Was instructed to let you know when the 2 weeks was up.  Please advise on what to do next.

## 2022-10-17 NOTE — TELEPHONE ENCOUNTER
----- Message from Lynnette Abdi sent at 10/17/2022  8:06 AM CDT -----  Contact: self  Requesting a call back regarding medication. Was advised to let her know when it had been two weeks... Please call back at [phone]

## 2022-10-18 NOTE — TELEPHONE ENCOUNTER
Please notify pt she can take every other night for a week and then every 3rd night for another week and update me

## 2022-10-31 ENCOUNTER — PATIENT MESSAGE (OUTPATIENT)
Dept: OBSTETRICS AND GYNECOLOGY | Facility: CLINIC | Age: 52
End: 2022-10-31
Payer: COMMERCIAL

## 2022-11-01 NOTE — TELEPHONE ENCOUNTER
Spoke with patient to clarify medications. She meant to say clorazepate and escitalopram she is taking daily. She will continue clorazepate but decrease escitalopram to 10 mg daily and will update next week for further instructions.

## 2022-11-07 ENCOUNTER — PATIENT MESSAGE (OUTPATIENT)
Dept: OBSTETRICS AND GYNECOLOGY | Facility: CLINIC | Age: 52
End: 2022-11-07
Payer: COMMERCIAL

## 2022-11-14 ENCOUNTER — PATIENT MESSAGE (OUTPATIENT)
Dept: OBSTETRICS AND GYNECOLOGY | Facility: CLINIC | Age: 52
End: 2022-11-14
Payer: COMMERCIAL

## 2022-11-21 ENCOUNTER — PATIENT MESSAGE (OUTPATIENT)
Dept: OBSTETRICS AND GYNECOLOGY | Facility: CLINIC | Age: 52
End: 2022-11-21
Payer: COMMERCIAL

## 2023-01-10 ENCOUNTER — OFFICE VISIT (OUTPATIENT)
Dept: OBSTETRICS AND GYNECOLOGY | Facility: CLINIC | Age: 53
End: 2023-01-10
Payer: COMMERCIAL

## 2023-01-10 VITALS
BODY MASS INDEX: 23.84 KG/M2 | SYSTOLIC BLOOD PRESSURE: 128 MMHG | HEART RATE: 80 BPM | DIASTOLIC BLOOD PRESSURE: 80 MMHG | HEIGHT: 64 IN | WEIGHT: 139.63 LBS

## 2023-01-10 DIAGNOSIS — Z01.419 WELL WOMAN EXAM WITH ROUTINE GYNECOLOGICAL EXAM: Primary | ICD-10-CM

## 2023-01-10 DIAGNOSIS — Z12.31 SCREENING MAMMOGRAM, ENCOUNTER FOR: ICD-10-CM

## 2023-01-10 PROCEDURE — 1159F PR MEDICATION LIST DOCUMENTED IN MEDICAL RECORD: ICD-10-PCS | Mod: CPTII,S$GLB,, | Performed by: OBSTETRICS & GYNECOLOGY

## 2023-01-10 PROCEDURE — 99396 PR PREVENTIVE VISIT,EST,40-64: ICD-10-PCS | Mod: S$GLB,,, | Performed by: OBSTETRICS & GYNECOLOGY

## 2023-01-10 PROCEDURE — 99396 PREV VISIT EST AGE 40-64: CPT | Mod: S$GLB,,, | Performed by: OBSTETRICS & GYNECOLOGY

## 2023-01-10 PROCEDURE — 3074F PR MOST RECENT SYSTOLIC BLOOD PRESSURE < 130 MM HG: ICD-10-PCS | Mod: CPTII,S$GLB,, | Performed by: OBSTETRICS & GYNECOLOGY

## 2023-01-10 PROCEDURE — 3079F DIAST BP 80-89 MM HG: CPT | Mod: CPTII,S$GLB,, | Performed by: OBSTETRICS & GYNECOLOGY

## 2023-01-10 PROCEDURE — 1159F MED LIST DOCD IN RCRD: CPT | Mod: CPTII,S$GLB,, | Performed by: OBSTETRICS & GYNECOLOGY

## 2023-01-10 PROCEDURE — 3008F BODY MASS INDEX DOCD: CPT | Mod: CPTII,S$GLB,, | Performed by: OBSTETRICS & GYNECOLOGY

## 2023-01-10 PROCEDURE — 3079F PR MOST RECENT DIASTOLIC BLOOD PRESSURE 80-89 MM HG: ICD-10-PCS | Mod: CPTII,S$GLB,, | Performed by: OBSTETRICS & GYNECOLOGY

## 2023-01-10 PROCEDURE — 3074F SYST BP LT 130 MM HG: CPT | Mod: CPTII,S$GLB,, | Performed by: OBSTETRICS & GYNECOLOGY

## 2023-01-10 PROCEDURE — 3008F PR BODY MASS INDEX (BMI) DOCUMENTED: ICD-10-PCS | Mod: CPTII,S$GLB,, | Performed by: OBSTETRICS & GYNECOLOGY

## 2023-01-10 NOTE — PROGRESS NOTES
Patsy Sullivan is a 52 y.o. female  who presents for a well woman exam.  She has no issues, problems, or complaints. Weaned off hormones and her antidepressant during the last year. Some mild hot flashes and irritability but will continue taking her over the counter menopause supplements.     Past Medical History:   Diagnosis Date    Anxiety and depression     Esophageal spasm     H/O gastroesophageal reflux (GERD)     History of IBS        Past Surgical History:   Procedure Laterality Date    AUGMENTATION OF BREAST  2006    CHOLECYSTECTOMY  2000    HYSTEROSCOPY WITH DILATION AND CURETTAGE OF UTERUS  2008    with Novasure endometrial ablation       OB History    Para Term  AB Living   2 2       2   SAB IAB Ectopic Multiple Live Births           2      # Outcome Date GA Lbr Dylan/2nd Weight Sex Delivery Anes PTL Lv   2 Para            1 Para                Family History   Problem Relation Age of Onset    No Known Problems Father     No Known Problems Mother        Social History     Tobacco Use    Smoking status: Never     Passive exposure: Never    Smokeless tobacco: Never   Substance Use Topics    Alcohol use: Never    Drug use: Never         Current Outpatient Medications:     fluticasone propionate (FLONASE) 50 mcg/actuation nasal spray, SPRAY 1 SPRAY INTO EACH NOSTRIL EVERY DAY *CAN FILL  ON MEDICAID*, Disp: , Rfl:      Review of patient's allergies indicates:   Allergen Reactions    Codeine Other (See Comments)     disorientation    Phenergan [promethazine] Other (See Comments)     GI upset        ROS:  GENERAL: Denies weight gain or weight loss. Feeling well overall.   SKIN: Denies rash or lesions.   HEAD: Denies head injury or headache.   NODES: Denies enlarged lymph nodes.   CHEST: Denies shortness of breath.   CARDIOVASCULAR: Denies palpitations or chest pain.   ABDOMEN: Denies abdominal pain, constipation, diarrhea, nausea, vomiting or rectal bleeding.   URINARY: Denies  "frequency, dysuria, hematuria, or burning on urination.  REPRODUCTIVE: See HPI.   BREASTS: Denies pain, lumps, or nipple discharge.   HEMATOLOGIC: Denies easy bruisability or excessive bleeding.  MUSCULOSKELETAL: Denies joint pain or swelling.   NEUROLOGIC: Denies syncope or weakness.   PSYCHIATRIC: Denies depression, anxiety or mood swings.    PHYSICAL EXAM:    /80 (BP Location: Left arm, Patient Position: Sitting, BP Method: Medium (Manual))   Pulse 80   Ht 5' 4" (1.626 m)   Wt 63.3 kg (139 lb 9.6 oz)   LMP  (LMP Unknown)   BMI 23.96 kg/m²    Body mass index is 23.96 kg/m².     APPEARANCE: Well nourished, well developed, in no acute distress.  AFFECT: WNL, alert and oriented x 3  SKIN: No acne or hirsutism  NECK: Neck symmetric without masses or thyromegaly  NODES: No inguinal, cervical, axillary, or femoral lymph node enlargement  CHEST: Good respiratory effect  ABDOMEN: Soft.  No tenderness or masses.  No hepatosplenomegaly.  No hernias.  BREASTS: Symmetrical, no skin changes or visible lesions.  No palpable masses, nipple discharge bilaterally.  PELVIC: Normal external genitalia without lesions.  Normal hair distribution.  Adequate perineal body, normal urethral meatus.  Urethra and bladder without tenderness or masses. Vagina moist and well rugated without lesions or discharge.  Cervix pink, without lesions, discharge or tenderness.  No significant cystocele or rectocele.  Bimanual exam shows uterus to be normal size, regular, mobile and nontender.  Adnexa without masses or tenderness.    EXTREMITIES: No edema.     Well woman exam with routine gynecological exam    Screening mammogram, encounter for  -     Mammo Digital Screening Bilat w/ Canelo; Future       Pap/HPV negative 2022  DEXA done last year        "

## 2024-03-05 NOTE — TELEPHONE ENCOUNTER
Please notify pt that it is ok to go back to shots if she prefers. Prescription sent.  
PAST MEDICAL HISTORY:  No pertinent past medical history